# Patient Record
Sex: FEMALE | Race: WHITE
[De-identification: names, ages, dates, MRNs, and addresses within clinical notes are randomized per-mention and may not be internally consistent; named-entity substitution may affect disease eponyms.]

---

## 2018-06-18 ENCOUNTER — HOSPITAL ENCOUNTER (EMERGENCY)
Dept: HOSPITAL 83 - ED | Age: 28
Discharge: HOME | End: 2018-06-18
Payer: COMMERCIAL

## 2018-06-18 VITALS — WEIGHT: 165 LBS | BODY MASS INDEX: 25.9 KG/M2 | HEIGHT: 66.97 IN

## 2018-06-18 DIAGNOSIS — F17.200: ICD-10-CM

## 2018-06-18 DIAGNOSIS — R07.89: Primary | ICD-10-CM

## 2018-06-18 DIAGNOSIS — Z88.0: ICD-10-CM

## 2021-02-03 ENCOUNTER — TELEPHONE (OUTPATIENT)
Dept: OBGYN CLINIC | Age: 31
End: 2021-02-03

## 2021-03-02 ENCOUNTER — ANCILLARY PROCEDURE (OUTPATIENT)
Dept: OBGYN CLINIC | Age: 31
End: 2021-03-02
Payer: COMMERCIAL

## 2021-03-02 ENCOUNTER — ROUTINE PRENATAL (OUTPATIENT)
Dept: OBGYN CLINIC | Age: 31
End: 2021-03-02
Payer: COMMERCIAL

## 2021-03-02 VITALS
BODY MASS INDEX: 29.44 KG/M2 | SYSTOLIC BLOOD PRESSURE: 108 MMHG | TEMPERATURE: 97.6 F | WEIGHT: 188 LBS | DIASTOLIC BLOOD PRESSURE: 72 MMHG | HEART RATE: 83 BPM

## 2021-03-02 DIAGNOSIS — O35.2XX0 HEREDITARY DISEASE IN FAMILY POSSIBLY AFFECTING FETUS, AFFECTING MANAGEMENT OF MOTHER IN PREGNANCY, SINGLE OR UNSPECIFIED FETUS: Primary | ICD-10-CM

## 2021-03-02 DIAGNOSIS — Z03.75 SUSPECTED SHORTENING OF CERVIX NOT FOUND: ICD-10-CM

## 2021-03-02 DIAGNOSIS — Z3A.19 19 WEEKS GESTATION OF PREGNANCY: ICD-10-CM

## 2021-03-02 DIAGNOSIS — O99.332 TOBACCO SMOKING AFFECTING PREGNANCY IN SECOND TRIMESTER: ICD-10-CM

## 2021-03-02 DIAGNOSIS — O46.8X2 SUBCHORIONIC HEMATOMA IN SECOND TRIMESTER, SINGLE OR UNSPECIFIED FETUS: ICD-10-CM

## 2021-03-02 DIAGNOSIS — O34.219 PREVIOUS CESAREAN DELIVERY, ANTEPARTUM CONDITION OR COMPLICATION: ICD-10-CM

## 2021-03-02 DIAGNOSIS — Z36.89 ENCOUNTER FOR FETAL ANATOMIC SURVEY: ICD-10-CM

## 2021-03-02 DIAGNOSIS — O99.322 DRUG DEPENDENCE AFFECTING PREGNANCY IN SECOND TRIMESTER: ICD-10-CM

## 2021-03-02 DIAGNOSIS — F12.20 CANNABIS DEPENDENCE (HCC): ICD-10-CM

## 2021-03-02 DIAGNOSIS — Z14.8: ICD-10-CM

## 2021-03-02 DIAGNOSIS — O09.212 PREVIOUS PRETERM DELIVERY IN SECOND TRIMESTER, ANTEPARTUM: ICD-10-CM

## 2021-03-02 DIAGNOSIS — O41.8X20 SUBCHORIONIC HEMATOMA IN SECOND TRIMESTER, SINGLE OR UNSPECIFIED FETUS: ICD-10-CM

## 2021-03-02 LAB
GLUCOSE URINE, POC: NORMAL
PROTEIN UA: NEGATIVE

## 2021-03-02 PROCEDURE — G8484 FLU IMMUNIZE NO ADMIN: HCPCS | Performed by: OBSTETRICS & GYNECOLOGY

## 2021-03-02 PROCEDURE — 81002 URINALYSIS NONAUTO W/O SCOPE: CPT | Performed by: OBSTETRICS & GYNECOLOGY

## 2021-03-02 PROCEDURE — 76811 OB US DETAILED SNGL FETUS: CPT | Performed by: OBSTETRICS & GYNECOLOGY

## 2021-03-02 PROCEDURE — 99203 OFFICE O/P NEW LOW 30 MIN: CPT | Performed by: OBSTETRICS & GYNECOLOGY

## 2021-03-02 PROCEDURE — 76817 TRANSVAGINAL US OBSTETRIC: CPT | Performed by: OBSTETRICS & GYNECOLOGY

## 2021-03-02 PROCEDURE — G8419 CALC BMI OUT NRM PARAM NOF/U: HCPCS | Performed by: OBSTETRICS & GYNECOLOGY

## 2021-03-02 PROCEDURE — G8427 DOCREV CUR MEDS BY ELIG CLIN: HCPCS | Performed by: OBSTETRICS & GYNECOLOGY

## 2021-03-02 PROCEDURE — 99242 OFF/OP CONSLTJ NEW/EST SF 20: CPT | Performed by: OBSTETRICS & GYNECOLOGY

## 2021-03-02 NOTE — PROGRESS NOTES
3/2/21    RE:  Lucina Gore   : 1990   AGE: 35 y.o. REFERRING PHYSICIAN:                 Bala Ordonez    Dear Dr. Melanie Mathews you for referring Lucina whittaker 35 y.o.  [id number] who is seen today in our office. 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 Mrs Lucina Gore gave the following history when I saw her today:    OB History    Para Term  AB Living   4 3 1 2 0 2   SAB TAB Ectopic Molar Multiple Live Births   0 0 0 0 0 2      # Outcome Date GA Lbr Sanya/2nd Weight Sex Delivery Anes PTL Lv   4 Current            3 Term 13 38w0d  5 lb 6 oz (2.438 kg) F CS-LTranv   MEHNAZ   2  12 27w1d  2 lb 7.5 oz (1.12 kg) M CS-LTranv         Name: Carlene Sheridan: 7  Apgar5: 9   1  11 33w0d  3 lb 6 oz (1.531 kg) F CS-LTranv Gen Y MEHNAZ      Birth Comments: Breech, Stayed in NICU for 2 weeks. PAST GYNECOLOGICAL  HISTORY:  Negative for abnormal pap smears requiring surgical treatment. Negative for sexually transmitted diseases. PAST MEDICAL HISTORY:  Past Medical History:   Diagnosis Date    Anemia     Bronchitis, asthmatic     Not taking medication since     Negative for Hypertension, Diabetes, Thyroid disease , or Heart disease. PAST SURGICAL HISTORY:  Past Surgical History:   Procedure Laterality Date     SECTION      FOOT SURGERY      Trauma    LUNG SURGERY     Negative for Appendectomy, Cholecystectomy or surgery on the cervix such as LEEP, [facility] or Cryotherapy. ALLERGIES:    Allergies   Allergen Reactions    Ceclor [Cefaclor] Hives and Shortness Of Breath    Iodine Rash     MEDICATIONS:    Prenatal Vitamins once per day   17p 250 mg IM once per week. Ferrous sulfate 325 mg orally once per day. SOCIAL  HISTORY:   · She smokes 10 cigarettes/day.   · She said that she stopped using marijuana on 2020 (she had a positive drug screen on 2021). · She denied alcohol and other illicit drug abuse. REVIEW OF SYSTEMS:    CONSTITUTIONAL : No fever, no chills   HEENT :  No headache, no visual changes, no rhinorrhea, no sore throat   CARDIOVASCULAR :  No pain, no palpitations, no edema   RESPIRATORY :  No pain, no shortness of breath   GASTROINTESTINAL : No N/V, no D/C, no abdominal pain   GENITOURINARY :  No dysuria, hematuria and no incontinence   MUSCULOSKELETAL:  No myalgia, No back pain  NEUROLOGICAL :  No numbness, no tingling, no tremors. No history of seizures    FAMILY MEDICAL HISTORY:   Negative for birth defects, chromosomal anomalies and Mental retardation. OB Genetic Screening    Patient's Age 35+ at Date of Delivery No     Thalassemia MCV<80 No     Neural Tube Defect No     Congenital Heart Defect No     Down Syndrome No     Byron-Sachs No     Sickle Cell Disease or Trait No     Hemophilia No     Muscular Dystrophy No     Cystic Fibrosis No     [place] Chorea No     Mental Retardation/Autism Yes second male cousin - Autistic    Was Person Treated for Fragilex? No     Other Inherited Genetic Chromosomal Disorder? Yes MOB carrier for SMA    Maternal Metabolic Disorder No     Patient or [de-identified] Father Had Other Defects? No     Recurrent Pregnancy Loss or Still Birth? No        OB Infection History    Blood Type O POS      High Risk Hepatitis B/Immunized? No     Live with Someone with or Exposed to TB? No     Patient or Partner has Hx of Genital Herpes? No     Rash or Viral Illness Since LMP? No     History of STD/GC/Chlamydia/HPV/Syphilis? No        Mrs Gretchen Ashraf had an uneventful course of pregnancy so far. She was started on treatment with weekly injections of 17 P in your office because of history of 2 previous  deliveries. When seen today in our office she had no complaints.      PHYSICAL EXAMINATION:    General genetic amniocentesis. Termination of pregnancy is illegal up to 24 weeks of gestation. Patient said that she would not consider termination of pregnancy if baby have birth defects chromosomal anomalies mental or motor retardation. She said however that she would like father of the baby to be tested. She said that this will give her peace of mind if he tests negative. He was not with her today and they live far away. She said that testing of the father of her baby was offered at your office. She lives 10 minutes away from your office. I recommend that the test be done as soon as possible. 10. She is to continue the treatment with weekly injections of 17 P, to delay onset and decrease the likelihood of another  delivery. 11. The cervical length measurement today is reassuring. It is within normal limits. No funneling or shortening were noted. 12. She is to continue to follow with you in your office for ongoing obstetric care. 13. I recommend follow-up ultrasound evaluation in our [facility] office in 4 weeks to check on cervical length and fetal wellbeing anatomy and growth, and to check on resolution of the large subchorionic organized hematoma. Thank you again, doctor, for allowing us to be of service to your patient. If I can be of further assistance, please do not hesitate to call. Sincerely,        Cesia Apple M.D., [address] [facility]    Time spent on the encounter was over 35 minutes including counseling  coordination of care and direct face-to-face contact with the patient. Current encounter billing:  [place] OFFICE CONSULTATION NEW/ESTAB PATIENT 30 MIN [[id number]]  US OB Detail Fetal Anatomy Single or 1st [id number] Custom]  US OB Transvaginal [id number] Custom]    **This report has been created using voice recognition software.  It may contain minor errors     which are inherent in voice recognition technology**

## 2021-03-02 NOTE — PATIENT INSTRUCTIONS
Please arrive for your scheduled appointment at least 15 minutes early with your actual insurance card+ a photo ID. Also if you need any refills ordered or have questions, it may take up 48 hours to reply. Please allow ample time for your refills. Call me when you use last refill. Thank you for your cooperation. Any questions contact Gabriella at [phone]. If you are experiencing an emergency and need immediate help, call 911 or go to go emergency room or labor and delivery. if you are sick, not feeling well or have an infectious process going on please reschedule your appointment by calling [phone]. Also if any family members are not feeling well, please do not bring them to your appointment. We appreciate your cooperation. We are doing this in order to protect our pregnant mothers+ their babies. Call your primary obstetrician with bleeding, leaking of fluid, abdominal tenderness, headache, blurry vision, epigastric pain and increased urinary frequency. Patient Education        Weeks 18 to 22 of Your Pregnancy: Care Instructions  Your Care Instructions     Your baby is continuing to develop quickly. At this stage, babies can now suck their thumbs,  firmly with their hands, and open and close their eyelids. Sometime between 18 and 22 weeks, you will start to feel your baby move. At first, these small fetal movements feel like fluttering or \"butterflies. \" Some women say that they feel like gas bubbles. As the baby grows, these movements will become stronger. You may also notice that your baby kicks and hiccups. During this time, you may find that your nausea and fatigue are gone. Overall, you may feel better and have more energy than you did in your first trimester. But you may also have new discomforts now, such as sleep problems or leg cramps. This care sheet can help you ease these discomforts. Follow-up care is a key part of your treatment and safety.  Be sure to make and go to all appointments, and call your doctor if you are having problems. It's also a good idea to know your test results and keep a list of the medicines you take. How can you care for yourself at home? Ease sleep problems  · Avoid caffeine in drinks or chocolate late in the day. · Get some exercise every day. · Take a warm shower or bath before bed. · Have a light snack or glass of milk at bedtime. · Do relaxation exercises in bed to calm your mind and body. · Support your legs and back with extra pillows. Try a pillow between your legs if you sleep on your side. · Do not use sleeping pills or alcohol. They could harm your baby. Ease leg cramps  · Do not massage your calf during the cramp. · Sit on a firm bed or chair. Straighten your leg, and bend your foot (flex your ankle) slowly upward, toward your knee. Bend your toes up and down. · Stand on a cool, flat surface. Stretch your toes upward, and take small steps walking on your heels. · Use a heating pad or hot water bottle to help with muscle ache. Prevent leg cramps  · Be sure to get enough calcium. If you are worried that you are not getting enough, talk to your doctor. · Exercise every day, and stretch your legs before bed. · Take a warm bath before bed, and try leg warmers at night. Where can you learn more? Go to https://[employer]. org and sign in to your [employer] account. Enter Z508 in the [facility] box to learn more about \"Weeks 18 to 22 of Your Pregnancy: Care Instructions. \"     If you do not have an account, please click on the \"Sign Up Now\" link. Current as of: February 11, 2020               Content Version: 12.6  © 0489-2958 [employer]. Care instructions adapted under license by [facility] ([facility]). If you have questions about a medical condition or this instruction, always ask your healthcare professional. [address] any warranty or liability for your use of this information.          Patient link.  Current as of: February 11, 2020               Content Version: 12.6  © 4592-3884 [employer]. Care instructions adapted under license by [facility] ([facility]). If you have questions about a medical condition or this instruction, always ask your healthcare professional. [address] any warranty or liability for your use of this information.

## 2021-03-02 NOTE — LETTER
· She said that she stopped using marijuana on 2020 (she had a positive drug screen on 2021). · She denied alcohol and other illicit drug abuse. REVIEW OF SYSTEMS:    CONSTITUTIONAL : No fever, no chills   HEENT :  No headache, no visual changes, no rhinorrhea, no sore throat   CARDIOVASCULAR :  No pain, no palpitations, no edema   RESPIRATORY :  No pain, no shortness of breath   GASTROINTESTINAL : No N/V, no D/C, no abdominal pain   GENITOURINARY :  No dysuria, hematuria and no incontinence   MUSCULOSKELETAL:  No myalgia, No back pain  NEUROLOGICAL :  No numbness, no tingling, no tremors. No history of seizures    FAMILY MEDICAL HISTORY:   Negative for birth defects, chromosomal anomalies and Mental retardation. OB Genetic Screening    Patient's Age 35+ at Date of Delivery No     Thalassemia MCV<80 No     Neural Tube Defect No     Congenital Heart Defect No     Down Syndrome No     Byron-Sachs No     Sickle Cell Disease or Trait No     Hemophilia No     Muscular Dystrophy No     Cystic Fibrosis No     [place] Chorea No     Mental Retardation/Autism Yes second male cousin - Autistic    Was Person Treated for Fragilex? No     Other Inherited Genetic Chromosomal Disorder? Yes MOB carrier for SMA    Maternal Metabolic Disorder No     Patient or [de-identified] Father Had Other Defects? No     Recurrent Pregnancy Loss or Still Birth? No        OB Infection History    Blood Type O POS      High Risk Hepatitis B/Immunized? No     Live with Someone with or Exposed to TB? No     Patient or Partner has Hx of Genital Herpes? No     Rash or Viral Illness Since LMP? No     History of STD/GC/Chlamydia/HPV/Syphilis? No        Mrs Jose Luque had an uneventful course of pregnancy so far. She was started on treatment with weekly injections of 17 P in your office because of history of 2 previous  deliveries. When seen today in our office she had no complaints. 5. Father of her baby Mr Lance Mckeon ( 7/3/1984) was not with her today. He was not tested for SMA. I told her that she should be tested. If he tests negative then there is no risk that her baby will have the disease. If he tests positive there is a 25% chance that the baby will have a debilitating disease, that can be diagnosed through a genetic amniocentesis. Termination of pregnancy is illegal up to 24 weeks of gestation. Patient said that she would not consider termination of pregnancy if baby have birth defects chromosomal anomalies mental or motor retardation. She said however that she would like father of the baby to be tested. She said that this will give her peace of mind if he tests negative. He was not with her today and they live far away. She said that testing of the father of her baby was offered at your office. She lives 10 minutes away from your office. I recommend that the test be done as soon as possible. 10. She is to continue the treatment with weekly injections of 17 P, to delay onset and decrease the likelihood of another  delivery. 11. The cervical length measurement today is reassuring. It is within normal limits. No funneling or shortening were noted. 12. She is to continue to follow with you in your office for ongoing obstetric care. 13. I recommend follow-up ultrasound evaluation in our [facility] office in 4 weeks to check on cervical length and fetal wellbeing anatomy and growth, and to check on resolution of the large subchorionic organized hematoma. Thank you again, , for allowing us to be of service to your patient. If I can be of further assistance, please do not hesitate to call. Sincerely,        Belle Sloan M.D., [address] [facility]    Time spent on the encounter was over 35 minutes including counseling  coordination of care and direct face-to-face contact with the patient.     Current encounter billing: [place] OFFICE CONSULTATION NEW/ESTAB PATIENT 30 MIN [[id number]]  US OB Detail Fetal Anatomy Single or 1st [id number] Custom]  US OB Transvaginal [id number] Custom]    **This report has been created using voice recognition software.  It may contain minor errors     which are inherent in voice recognition technology**

## 2021-03-30 ENCOUNTER — ANCILLARY PROCEDURE (OUTPATIENT)
Dept: OBGYN CLINIC | Age: 31
End: 2021-03-30
Payer: COMMERCIAL

## 2021-03-30 ENCOUNTER — ROUTINE PRENATAL (OUTPATIENT)
Dept: OBGYN CLINIC | Age: 31
End: 2021-03-30
Payer: COMMERCIAL

## 2021-03-30 VITALS
DIASTOLIC BLOOD PRESSURE: 64 MMHG | HEART RATE: 74 BPM | HEIGHT: 67 IN | SYSTOLIC BLOOD PRESSURE: 92 MMHG | WEIGHT: 196.6 LBS | TEMPERATURE: 97.4 F | BODY MASS INDEX: 30.86 KG/M2

## 2021-03-30 DIAGNOSIS — O99.322 DRUG DEPENDENCE AFFECTING PREGNANCY IN SECOND TRIMESTER: ICD-10-CM

## 2021-03-30 DIAGNOSIS — Z36.89 ENCOUNTER FOR FETAL ANATOMIC SURVEY: ICD-10-CM

## 2021-03-30 DIAGNOSIS — O09.212 PREVIOUS PRETERM DELIVERY IN SECOND TRIMESTER, ANTEPARTUM: Primary | ICD-10-CM

## 2021-03-30 DIAGNOSIS — Z03.75 SUSPECTED SHORTENING OF CERVIX NOT FOUND: ICD-10-CM

## 2021-03-30 DIAGNOSIS — O34.219 PREVIOUS CESAREAN DELIVERY, ANTEPARTUM CONDITION OR COMPLICATION: ICD-10-CM

## 2021-03-30 DIAGNOSIS — Z14.8: ICD-10-CM

## 2021-03-30 DIAGNOSIS — O46.8X2 SUBCHORIONIC HEMATOMA IN SECOND TRIMESTER, SINGLE OR UNSPECIFIED FETUS: ICD-10-CM

## 2021-03-30 DIAGNOSIS — O41.8X20 SUBCHORIONIC HEMATOMA IN SECOND TRIMESTER, SINGLE OR UNSPECIFIED FETUS: ICD-10-CM

## 2021-03-30 DIAGNOSIS — O35.2XX0 HEREDITARY DISEASE IN FAMILY POSSIBLY AFFECTING FETUS, AFFECTING MANAGEMENT OF MOTHER IN PREGNANCY, SINGLE OR UNSPECIFIED FETUS: ICD-10-CM

## 2021-03-30 DIAGNOSIS — Z3A.23 23 WEEKS GESTATION OF PREGNANCY: ICD-10-CM

## 2021-03-30 DIAGNOSIS — F12.20 CANNABIS DEPENDENCE (HCC): ICD-10-CM

## 2021-03-30 DIAGNOSIS — O99.332 TOBACCO SMOKING AFFECTING PREGNANCY IN SECOND TRIMESTER: ICD-10-CM

## 2021-03-30 LAB
GLUCOSE URINE, POC: NEGATIVE
PROTEIN UA: NEGATIVE

## 2021-03-30 PROCEDURE — G8484 FLU IMMUNIZE NO ADMIN: HCPCS | Performed by: OBSTETRICS & GYNECOLOGY

## 2021-03-30 PROCEDURE — G8419 CALC BMI OUT NRM PARAM NOF/U: HCPCS | Performed by: OBSTETRICS & GYNECOLOGY

## 2021-03-30 PROCEDURE — 76819 FETAL BIOPHYS PROFIL W/O NST: CPT | Performed by: OBSTETRICS & GYNECOLOGY

## 2021-03-30 PROCEDURE — 76805 OB US >/= 14 WKS SNGL FETUS: CPT | Performed by: OBSTETRICS & GYNECOLOGY

## 2021-03-30 PROCEDURE — 99212 OFFICE O/P EST SF 10 MIN: CPT | Performed by: OBSTETRICS & GYNECOLOGY

## 2021-03-30 PROCEDURE — 4004F PT TOBACCO SCREEN RCVD TLK: CPT | Performed by: OBSTETRICS & GYNECOLOGY

## 2021-03-30 PROCEDURE — 76817 TRANSVAGINAL US OBSTETRIC: CPT | Performed by: OBSTETRICS & GYNECOLOGY

## 2021-03-30 PROCEDURE — 81002 URINALYSIS NONAUTO W/O SCOPE: CPT | Performed by: OBSTETRICS & GYNECOLOGY

## 2021-03-30 PROCEDURE — 99213 OFFICE O/P EST LOW 20 MIN: CPT | Performed by: OBSTETRICS & GYNECOLOGY

## 2021-03-30 PROCEDURE — G8427 DOCREV CUR MEDS BY ELIG CLIN: HCPCS | Performed by: OBSTETRICS & GYNECOLOGY

## 2021-03-30 NOTE — LETTER
present time)  8. Subchorionic organized hematoma (arising from fundal area of placenta)  9. Genetic consultation with [facility] ordered, not done yet. RECOMMENDATIONS/PLAN:  I discussed with the patient the following points:    1. The benefits and limitations of ultrasound in prenatal diagnosis. Some defects might not always be seen by ultrasound. Estimated incidence of these defects in the general population is 2- 4%. 2. No structural  anomalies are noted. Only genetic amniocentesis can rule out fetal chromosome anomalies. Normal ultrasound does not. 3. The size of her baby is appropriate for gestational age. 4. The ill effects of cigarette smoking and substance abuse during pregnancy and its association with an increased risk of intrauterine growth restriction, placental abruption, and pregnancy loss. In addition to the increased risk of  morbidity and mortality there is an increased risk of sudden infant death syndrome in the households where people smoke. I recommend that she stops smoking, and abstains from illicit drug use. 5. Spinal muscular atrophy (SMA) is a genetic disorder, with an autosomal recessive pattern, which means that  two copies of the defective gene - one from each parent - are required to inherit the disorder: the parents do not need to be themselves affected. Father of baby not tested yet. Genetic counseling with [facility] ordered, not done yet. 6.   She is to continue the treatment with weekly injections of 17 P, to delay onset and decrease the likelihood of another  delivery. 7. The cervical length measurement today is reassuring. It is within normal limits. No funneling or shortening were noted. 8. Fetal well-being was confirmed today.   The amount of fluid around baby is normal.  The Biophysical profile score of 8/8 is reassuring, and the umbilical artery Doppler studies are normal.  9. She should monitor fetal well-being at home by counting movements after dinner. Her baby should  move 10 times in 2 hours; otherwise, she should call your office immediately. She is also to call, if she develops any headaches, blurred vision, abdominal pain, bleeding, or spotting, which are signs of preeclampsia. 10. She is to continue to follow with you in your office for ongoing obstetric care. 11. I recommend follow-up ultrasound evaluation in our [facility] office in 4 weeks to check on cervical length and fetal wellbeing anatomy and growth, and to check on resolution of the large subchorionic organized hematoma. Thank you again, doctor, for allowing us to be of service to your patient. If I can be of further assistance, please do not hesitate to call. Sincerely,        Hernandez Mittal M.D., [facility]    Time spent on the encounter was over 15 minutes including counseling  coordination of care and direct face-to-face contact with the patient. Current encounter billing:  ME OFFICE OUTPATIENT VISIT 10-19 MINUTES [[id number]]  US OB 14 Plus Weeks Single or First Gestation [[id number] Custom]  US Fetal Biophysical Profile WO Non Stress Testing [[id number] Custom]  US OB Transvaginal [[id number] Custom]    **This report has been created using voice recognition software.  It may contain minor errors     which are inherent in voice recognition technology**

## 2021-03-30 NOTE — PROGRESS NOTES
3/30/21     RE:  Helio Wolf   : 1990   AGE: 35 y.o. REFERRING PHYSICIAN:                 Deysi Rangel    Dear     Mrs. Helio whittaker 35 y.o.  [id number]  is seen today on follow up in our office. 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 MEDICATIONS:    Prenatal Vitamins once per day   17p 250 mg IM once per week. Ferrous sulfate 325 mg orally once per day. INTERVAL HISTORY:  Mrs Helio Wolf had an uneventful course of pregnancy since her last visit to our office. When seen today in our office she had no complaints. She is still smoking 7 cigarettes/day. She stopped using marijuana in the first trimester, when found pregnant. PHYSICAL EXAMINATION:  General Appearance:  Healthy looking, alert, no acute distress. Eyes:     No pallor, no icterus, no photophobia. Ears:     No ear drainage. Nose:     No nasal drainage, no paranasal sinus tenderness. Throat:   Mucosa moist, no oral thrush, no exudate. Neck:     No nuchal rigidity. Back:     No CVA tenderness. Abdomen:    Soft nontender. Extremities:    No pretibial pitting edema, no calf muscle tenderness. Skin:     No rashes, no lesions. BP: 92/64 Weight: 196 lb 9.6 oz (89.2 kg) Height: 5' 7\" (170.2 cm) Pulse: 74     Body mass index is 30.79 kg/m². Urine dipstick:  Glucose : Negative   Albumin:  Negative       An ultrasound evaluation was done in our office today. Please refer to the enclosed copy of the ultrasound report for further information. IMPRESSION:  1. A  23w6d  intrauterine gestation. 2. Carrier of SMA (spinal muscular atrophy) mutation. 3. Two previous  deliveries (At 28 and 27 weeks)  4. Three previous  deliveries. 5. Cigarette smoking during pregnancy. 6. Marijuana abuse during pregnancy. (Positive drug screen 2021)  7.  Asthma (not active at present time)  8. Subchorionic organized hematoma (arising from fundal area of placenta)  9. Genetic consultation with [facility] ordered, not done yet. RECOMMENDATIONS/PLAN:  I discussed with the patient the following points:    1. The benefits and limitations of ultrasound in prenatal diagnosis. Some defects might not always be seen by ultrasound. Estimated incidence of these defects in the general population is 2- 4%. 2. No structural  anomalies are noted. Only genetic amniocentesis can rule out fetal chromosome anomalies. Normal ultrasound does not. 3. The size of her baby is appropriate for gestational age. 4. The ill effects of cigarette smoking and substance abuse during pregnancy and its association with an increased risk of intrauterine growth restriction, placental abruption, and pregnancy loss. In addition to the increased risk of  morbidity and mortality there is an increased risk of sudden infant death syndrome in the households where people smoke. I recommend that she stops smoking, and abstains from illicit drug use. 5. Spinal muscular atrophy (SMA) is a genetic disorder, with an autosomal recessive pattern, which means that  two copies of the defective gene - one from each parent - are required to inherit the disorder: the parents do not need to be themselves affected. Father of baby not tested yet. Genetic counseling with [facility] ordered, not done yet. 6.   She is to continue the treatment with weekly injections of 17 P, to delay onset and decrease the likelihood of another  delivery. 7. The cervical length measurement today is reassuring. It is within normal limits. No funneling or shortening were noted. 8. Fetal well-being was confirmed today.   The amount of fluid around baby is normal.  The Biophysical profile score of 8/8 is reassuring, and the umbilical artery Doppler studies are normal.  9. She should monitor fetal well-being at home by counting movements after dinner. Her baby should  move 10 times in 2 hours; otherwise, she should call your office immediately. She is also to call, if she develops any headaches, blurred vision, abdominal pain, bleeding, or spotting, which are signs of preeclampsia. 10. She is to continue to follow with you in your office for ongoing obstetric care. 11. I recommend follow-up ultrasound evaluation in our [facility] office in 4 weeks to check on cervical length and fetal wellbeing anatomy and growth, and to check on resolution of the large subchorionic organized hematoma. Thank you again, doctor, for allowing us to be of service to your patient. If I can be of further assistance, please do not hesitate to call. Sincerely,        Sultana Mays M.D., [address] [facility]    Time spent on the encounter was over 15 minutes including counseling  coordination of care and direct face-to-face contact with the patient. Current encounter billing:  [place] OFFICE OUTPATIENT VISIT 10-19 MINUTES [[id number]]  US OB 14 Plus Weeks Single or First Gestation [[id number] Custom]  US Fetal Biophysical Profile WO Non Stress Testing [[id number] Custom]  US OB Transvaginal [38005 Custom]    **This report has been created using voice recognition software.  It may contain minor errors     which are inherent in voice recognition technology**

## 2021-03-30 NOTE — PATIENT INSTRUCTIONS
Call your primary obstetrician with bleeding, leaking of fluid, abdominal tenderness, headache, blurry vision, epigastric pain and increased urinary frequency. If you are experiencing an emergency and need immediate help, call 911 or go to go emergency room or labor and delivery. Please arrive for your scheduled appointment at least 15 minutes early with your actual insurance card+ a photo ID. Also if you need any refills ordered or have questions, it may take up 48 hours to reply. Please allow ample time for your refills. Call me when you use last refill. Thank you for your cooperation. You might be having an NST at your next appt. Please eat a large snack or breakfast before coming to office. Thank youif you are sick, not feeling well or have an infectious process going on please reschedule your appointment by calling [phone]. Also if any family members are not feeling well, please do not bring them to your appointment. We appreciate your cooperation. We are doing this in order to protect our pregnant mothers+ their babies. Patient Education        Weeks 22 to 26 of Your Pregnancy: Care Instructions  Overview     As you enter your 7th month of pregnancy at week 26, your baby's lungs are growing stronger and getting ready to breathe. You may notice that your baby responds to the sound of your or your partner's voice. You may also notice that your baby does less turning and twisting and more squirming, kicking, or jerking. Jerking often means that your baby has hiccups. Hiccups are normal and are only temporary. You may want to think about attending a childbirth preparation class. This is also a good time to start thinking about whether you want to have pain medicine during labor. Most pregnant women are tested for gestational diabetes between weeks 25 and 28. Gestational diabetes occurs when your blood sugar level gets too high when you're pregnant.  The test is important, because you can have gestational diabetes and not know it. But the condition can cause problems for your baby. Follow-up care is a key part of your treatment and safety. Be sure to make and go to all appointments, and call your doctor if you are having problems. It's also a good idea to know your test results and keep a list of the medicines you take. How can you care for yourself at home? Ease discomfort from your baby's kicking  · Change your position. Sometimes this will cause your baby to change position too. · Take a deep breath while you raise your arm over your head. Then breathe out while you drop your arm. Do Kegel exercises to prevent urine from leaking  · You can do Kegel exercises while you stand or sit. ? Squeeze the same muscles you would use to stop your urine. Your belly and thighs should not move. ? Hold the squeeze for 3 seconds, and then relax for 3 seconds. ? Start with 3 seconds. Then add 1 second each week until you are able to squeeze for 10 seconds. ? Repeat the exercise 10 to 15 times for each session. Do three or more sessions each day. Ease or reduce swelling in your feet, ankles, hands, and fingers  · If your fingers are puffy, take off your rings. · Do not eat high-salt foods, such as potato chips. · Prop up your feet on a stool or couch as much as possible. Sleep with pillows under your feet. · Do not stand for long periods of time or wear tight shoes. · Wear support stockings. Where can you learn more? Go to https://AxelaCarepeOmgili.Seismotech. org and sign in to your [facility] account. Enter G264 in the [facility] box to learn more about \"Weeks 22 to 26 of Your Pregnancy: Care Instructions. \"     If you do not have an account, please click on the \"Sign Up Now\" link. Current as of: October 8, 2020               Content Version: 12.8  © 2229-5033 Healthwise, Incorporated. Care instructions adapted under license by [facility] ([facility]).  If you have questions about a medical condition or this instruction, always ask your healthcare professional. [place] [address] any warranty or liability for your use of this information. Patient Education        Learning About When to Call Your Doctor During Pregnancy (After 20 Weeks)  Your Care Instructions  It's common to have concerns about what might be a problem during pregnancy. Although most pregnant women don't have any serious problems, it's important to know when to call your doctor if you have certain symptoms or signs of labor. These are general suggestions. Your doctor may give you some more information about when to call. When to call your doctor (after 20 weeks)  Call 911 anytime you think you may need emergency care. For example, call if:  · You have severe vaginal bleeding. · You have sudden, severe pain in your belly. · You passed out (lost consciousness). · You have a seizure. · You see or feel the umbilical cord. · You think you are about to deliver your baby and can't make it safely to the hospital.  Call your doctor now or seek immediate medical care if:  · You have vaginal bleeding. · You have belly pain. · You have a fever. · You have symptoms of preeclampsia, such as:  ? Sudden swelling of your face, hands, or feet. ? New vision problems (such as dimness, blurring, or seeing spots). ? A severe headache. · You have a sudden release of fluid from your vagina. (You think your water broke.)  · You think that you may be in labor. This means that you've had at least 6 contractions in an hour. · You notice that your baby has stopped moving or is moving much less than normal.  · You have symptoms of a urinary tract infection. These may include:  ? Pain or burning when you urinate. ? A frequent need to urinate without being able to pass much urine. ? Pain in the flank, which is just below the rib cage and above the waist on either side of the back. ? Blood in your urine.   Watch closely for changes in your health, and be sure to contact your doctor if:  · You have vaginal discharge that smells bad. · You have skin changes, such as:  ? A rash. ? Itching. ? Yellow color to your skin. · You have other concerns about your pregnancy. If you have labor signs at 37 weeks or more  If you have signs of labor at 37 weeks or more, your doctor may tell you to call when your labor becomes more active. Symptoms of active labor include:  · Contractions that are regular. · Contractions that are less than 5 minutes apart. · Contractions that are hard to talk through. Follow-up care is a key part of your treatment and safety. Be sure to make and go to all appointments, and call your doctor if you are having problems. It's also a good idea to know your test results and keep a list of the medicines you take. Where can you learn more? Go to https://YourEncorepepicewLifecrowd.Digonex Technologies. org and sign in to your [employer] account. Enter  in the [employer] box to learn more about \"Learning About When to Call Your Doctor During Pregnancy (After 20 Weeks). \"     If you do not have an account, please click on the \"Sign Up Now\" link. Current as of: October 8, 2020               Content Version: 12.8  © 2006-2021 Healthwise, Incorporated. Care instructions adapted under license by [facility] ([facility]). If you have questions about a medical condition or this instruction, always ask your healthcare professional. [place] [address] any warranty or liability for your use of this information.

## 2021-05-04 ENCOUNTER — ANCILLARY PROCEDURE (OUTPATIENT)
Dept: OBGYN CLINIC | Age: 31
End: 2021-05-04
Payer: COMMERCIAL

## 2021-05-04 ENCOUNTER — ROUTINE PRENATAL (OUTPATIENT)
Dept: OBGYN CLINIC | Age: 31
End: 2021-05-04
Payer: COMMERCIAL

## 2021-05-04 VITALS
SYSTOLIC BLOOD PRESSURE: 99 MMHG | WEIGHT: 195.8 LBS | DIASTOLIC BLOOD PRESSURE: 63 MMHG | HEIGHT: 67 IN | BODY MASS INDEX: 30.73 KG/M2 | HEART RATE: 90 BPM

## 2021-05-04 DIAGNOSIS — O34.219 PREVIOUS CESAREAN DELIVERY, ANTEPARTUM CONDITION OR COMPLICATION: ICD-10-CM

## 2021-05-04 DIAGNOSIS — O46.8X3 SUBCHORIONIC HEMATOMA IN THIRD TRIMESTER, SINGLE OR UNSPECIFIED FETUS: ICD-10-CM

## 2021-05-04 DIAGNOSIS — O41.8X30 SUBCHORIONIC HEMATOMA IN THIRD TRIMESTER, SINGLE OR UNSPECIFIED FETUS: ICD-10-CM

## 2021-05-04 DIAGNOSIS — O09.213 PREVIOUS PRETERM DELIVERY IN THIRD TRIMESTER, ANTEPARTUM: Primary | ICD-10-CM

## 2021-05-04 DIAGNOSIS — Z14.8: ICD-10-CM

## 2021-05-04 DIAGNOSIS — O99.213 OBESITY AFFECTING PREGNANCY IN THIRD TRIMESTER: ICD-10-CM

## 2021-05-04 DIAGNOSIS — O99.323 DRUG DEPENDENCE AFFECTING PREGNANCY IN THIRD TRIMESTER: ICD-10-CM

## 2021-05-04 DIAGNOSIS — F12.20 CANNABIS DEPENDENCE (HCC): ICD-10-CM

## 2021-05-04 DIAGNOSIS — O35.2XX0 HEREDITARY DISEASE IN FAMILY POSSIBLY AFFECTING FETUS, AFFECTING MANAGEMENT OF MOTHER IN PREGNANCY, SINGLE OR UNSPECIFIED FETUS: ICD-10-CM

## 2021-05-04 DIAGNOSIS — Z3A.28 28 WEEKS GESTATION OF PREGNANCY: ICD-10-CM

## 2021-05-04 DIAGNOSIS — O99.333 TOBACCO SMOKING COMPLICATING PREGNANCY, THIRD TRIMESTER: ICD-10-CM

## 2021-05-04 LAB
GLUCOSE URINE, POC: NEGATIVE
PROTEIN UA: NEGATIVE

## 2021-05-04 PROCEDURE — 81002 URINALYSIS NONAUTO W/O SCOPE: CPT | Performed by: OBSTETRICS & GYNECOLOGY

## 2021-05-04 PROCEDURE — 4004F PT TOBACCO SCREEN RCVD TLK: CPT | Performed by: OBSTETRICS & GYNECOLOGY

## 2021-05-04 PROCEDURE — 99213 OFFICE O/P EST LOW 20 MIN: CPT | Performed by: OBSTETRICS & GYNECOLOGY

## 2021-05-04 PROCEDURE — 76816 OB US FOLLOW-UP PER FETUS: CPT | Performed by: OBSTETRICS & GYNECOLOGY

## 2021-05-04 PROCEDURE — 99212 OFFICE O/P EST SF 10 MIN: CPT | Performed by: OBSTETRICS & GYNECOLOGY

## 2021-05-04 PROCEDURE — G8419 CALC BMI OUT NRM PARAM NOF/U: HCPCS | Performed by: OBSTETRICS & GYNECOLOGY

## 2021-05-04 PROCEDURE — 76817 TRANSVAGINAL US OBSTETRIC: CPT | Performed by: OBSTETRICS & GYNECOLOGY

## 2021-05-04 PROCEDURE — G8427 DOCREV CUR MEDS BY ELIG CLIN: HCPCS | Performed by: OBSTETRICS & GYNECOLOGY

## 2021-05-04 PROCEDURE — 76819 FETAL BIOPHYS PROFIL W/O NST: CPT | Performed by: OBSTETRICS & GYNECOLOGY

## 2021-05-04 NOTE — PROGRESS NOTES
21     RE:  Kayleen Mendez   : 1990   AGE: 35 y.o. REFERRING PHYSICIAN:                 Fina Solis [place]    Dear     Mrs. Kayleen Mendez a 35 y.o.  [id number]  is seen today on follow up in our office. 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 MEDICATIONS:    Prenatal Vitamins once per day   17p 250 mg IM once per week. INTERVAL HISTORY:  Mrs Kayleen Mendez had an uneventful course of pregnancy since her last visit to our office. When seen today in our office she had no complaints. She is still smoking 10 cigarettes/day. She stopped using marijuana in the first trimester, when found pregnant. PHYSICAL EXAMINATION:  General Appearance:  Healthy looking, alert, no acute distress. Eyes:     No pallor, no icterus, no photophobia. Ears:     No ear drainage. Nose:     No nasal drainage, no paranasal sinus tenderness. Throat:   Mucosa moist, no oral thrush, no exudate. Neck:     No nuchal rigidity. Back:     No CVA tenderness. Abdomen:    Soft nontender. Extremities:    No pretibial pitting edema, no calf muscle tenderness. Skin:     No rashes, no lesions. BP: 99/63 Weight: 195 lb 12.8 oz (88.8 kg) Height: 5' 7\" (170.2 cm) Pulse: 90     Body mass index is 30.67 kg/m². Urine dipstick:  Glucose : Negative   Albumin:  Negative       An ultrasound evaluation was done in our office today. Please refer to the enclosed copy of the ultrasound report for further information. IMPRESSION:  1. A  28w6d  intrauterine gestation. 2. Obesity. 3. Carrier of SMA (spinal muscular atrophy) mutation. 4. Two previous  deliveries (At 28 and 27 weeks)  5. Three previous  deliveries. 6. Cigarette smoking during pregnancy. 7. Marijuana abuse during pregnancy. (Positive drug screen 2021)  8. Asthma (not active at present time)  9.  Subchorionic organized hematoma (arising from fundal area of placenta)  10. Genetic consultation with [facility] ordered, not done yet. RECOMMENDATIONS/PLAN:  I discussed with the patient the following points:    1. The benefits and limitations of ultrasound in prenatal diagnosis. Some defects might not always be seen by ultrasound. Estimated incidence of these defects in the general population is 2- 4%. 2. No structural  anomalies are noted. Only genetic amniocentesis can rule out fetal chromosome anomalies. Normal ultrasound does not. 3. Obesity is assosiated with an increased risk of developing gestational diabetes, and disturbance in the growth of her baby, such as Large for dates and small for Dates. She said the gestational diabetes was ruled out with a recent negative glucose tolerance test done in your office. Result of the test not available for review. 4. The size of her baby is appropriate for gestational age. 5. The ill effects of cigarette smoking and substance abuse during pregnancy and its association with an increased risk of intrauterine growth restriction, placental abruption, and pregnancy loss. In addition to the increased risk of  morbidity and mortality there is an increased risk of sudden infant death syndrome in the households where people smoke. I recommend that she stops smoking, and abstains from illicit drug use. 6. Spinal muscular atrophy (SMA) is a genetic disorder, with an autosomal recessive pattern, which means that  two copies of the defective gene - one from each parent - are required to inherit the disorder: the parents do not need to be themselves affected. Father of baby not tested yet. Genetic counseling with [facility] ordered, not done yet. 7.   She is to continue the treatment with weekly injections of 17 P, to delay onset and decrease the likelihood of another  delivery.   8. The cervical length measurement today is

## 2021-05-04 NOTE — PATIENT INSTRUCTIONS
if you are sick, not feeling well or have an infectious process going on please reschedule your appointment by calling [phone]. Also if any family members are not feeling well, please do not bring them to your appointment. We appreciate your cooperation. We are doing this in order to protect our pregnant mothers+ their babies. Call your primary obstetrician with bleeding, leaking of fluid, abdominal tenderness, headache, blurry vision, epigastric pain and increased urinary frequency. Any questions contact Gabriella at [phone]. If you are experiencing an emergency and need immediate help, call 911 or go to go emergency room or labor and delivery. Please arrive for your scheduled appointment at least 15 minutes early with your actual insurance card+ a photo ID. Also if you need any refills ordered or have questions, it may take up 48 hours to reply. Please allow ample time for your refills. Call me when you use last refill. Thank you for your cooperation. You might be having an NST at your next appt. Please eat a large snack or breakfast before coming to office. Thank youDo kick counts after dinner. Call your primary obstetrician if less than 10 kicks in 2 hours after dinner. Call your primary obstetrician with bleeding, leaking of fluid, abdominal tenderness, headache, blurry vision, epigastric pain and increased urinary frequency. Patient Education        Weeks 26 to 30 of Your Pregnancy: Care Instructions  Overview     You are now entering your last trimester of pregnancy. Your baby is growing quickly. Patricia Guzmán probably feel your baby moving around more often. Your doctor may ask you to count your baby's kicks. Your back may ache as your body gets used to your baby's size and length. If you haven't already had the Tdap shot during this pregnancy, talk to your doctor about getting it. It will help protect your  against pertussis infection.   During this time, it's important to take care of yourself and pay attention to what your body needs. If you feel sexual, you can explore ways to be close with your partner that match your comfort and desire. Follow-up care is a key part of your treatment and safety. Be sure to make and go to all appointments, and call your doctor if you are having problems. It's also a good idea to know your test results and keep a list of the medicines you take. How can you care for yourself at home? Take it easy at work  · Take frequent breaks. If possible, stop working when you are tired, and rest during your lunch hour. · Take bathroom breaks every 2 hours. · Change positions often. If you sit for long periods, stand up and walk around. · When you stand for a long time, keep one foot on a low stool with your knee bent. After standing a lot, sit with your feet up. · Avoid fumes, chemicals, and tobacco smoke. Be sexual in your own way  · Having sex during pregnancy is okay, unless your doctor tells you not to. · You may be very interested in sex, or you may have no interest at all. · Your growing belly can make it hard to find a good position during intercourse. [place] and explore. · You may get cramps in your uterus when your partner touches your breasts. · A back rub may relieve the backache or cramps that sometimes follow orgasm. Learn about  labor  · Watch for signs of  labor. You may be going into labor if:  ? You have menstrual-like cramps, with or without nausea. ? You have about 6 or more contractions in 1 hour, even after you have had a glass of water and are resting. ? You have a low, dull backache that does not go away when you change your position. ? You have pain or pressure in your pelvis that comes and goes in a pattern. ? You have intestinal cramping or flu-like symptoms, with or without diarrhea.  ? You notice an increase or change in your vaginal discharge. Discharge may be heavy, mucus-like, watery, or streaked with blood. ?  Your water breaks. · If you think you have  labor:  ? Drink 2 or 3 glasses of water or juice. Not drinking enough fluids can cause contractions. ? Stop what you are doing, and empty your bladder. Then lie down on your left side for at least 1 hour. ? While lying on your side, find your breast bone. Put your fingers in the soft spot just below it. Move your fingers down toward your belly button to find the top of your uterus. Check to see if it is tight. ? Contractions can be weak or strong. Record your contractions for an hour. Time a contraction from the start of one contraction to the start of the next one.  ? Single or several strong contractions without a pattern are called Srikanth-Arevalo contractions. They are practice contractions but not the start of labor. They often stop if you change what you are doing. ? Call your doctor if you have regular contractions. Where can you learn more? Go to https://Wirevik.EaglEyeMed. org and sign in to your [email or web address] account. Enter D742 in the [employer] box to learn more about \"Weeks 26 to 30 of Your Pregnancy: Care Instructions. \"     If you do not have an account, please click on the \"Sign Up Now\" link. Current as of: 2020               Content Version: 12.8   [email or web address]. Care instructions adapted under license by [facility] ([facility]). If you have questions about a medical condition or this instruction, always ask your healthcare professional. [place] [address] any warranty or liability for your use of this information. Patient Education        Learning About When to Call Your Doctor During Pregnancy (After 20 Weeks)  Your Care Instructions  It's common to have concerns about what might be a problem during pregnancy. Although most pregnant women don't have any serious problems, it's important to know when to call your doctor if you have certain symptoms or signs of labor.   These are general suggestions. Your doctor may give you some more information about when to call. When to call your doctor (after 20 weeks)  Call 911 anytime you think you may need emergency care. For example, call if:  · You have severe vaginal bleeding. · You have sudden, severe pain in your belly. · You passed out (lost consciousness). · You have a seizure. · You see or feel the umbilical cord. · You think you are about to deliver your baby and can't make it safely to the hospital.  Call your doctor now or seek immediate medical care if:  · You have vaginal bleeding. · You have belly pain. · You have a fever. · You have symptoms of preeclampsia, such as:  ? Sudden swelling of your face, hands, or feet. ? New vision problems (such as dimness, blurring, or seeing spots). ? A severe headache. · You have a sudden release of fluid from your vagina. (You think your water broke.)  · You think that you may be in labor. This means that you've had at least 6 contractions in an hour. · You notice that your baby has stopped moving or is moving much less than normal.  · You have symptoms of a urinary tract infection. These may include:  ? Pain or burning when you urinate. ? A frequent need to urinate without being able to pass much urine. ? Pain in the flank, which is just below the rib cage and above the waist on either side of the back. ? Blood in your urine. Watch closely for changes in your health, and be sure to contact your doctor if:  · You have vaginal discharge that smells bad. · You have skin changes, such as:  ? A rash. ? Itching. ? Yellow color to your skin. · You have other concerns about your pregnancy. If you have labor signs at 37 weeks or more  If you have signs of labor at 37 weeks or more, your doctor may tell you to call when your labor becomes more active. Symptoms of active labor include:  · Contractions that are regular. · Contractions that are less than 5 minutes apart.   · Contractions that are hard to talk through. Follow-up care is a key part of your treatment and safety. Be sure to make and go to all appointments, and call your doctor if you are having problems. It's also a good idea to know your test results and keep a list of the medicines you take. Where can you learn more? Go to https://chpepiceweb.Meaningo. org and sign in to your Axxana account. Enter  in the [employer] box to learn more about \"Learning About When to Call Your Doctor During Pregnancy (After 20 Weeks). \"     If you do not have an account, please click on the \"Sign Up Now\" link. Current as of: October 8, 2020               Content Version: 12.8  © 2006-2021 Healthwise, PerformYard. Care instructions adapted under license by [facility] ([facility]). If you have questions about a medical condition or this instruction, always ask your healthcare professional. [place] [address] any warranty or liability for your use of this information. Patient Education        Counting Your Baby's Kicks: Care Instructions  Your Care Instructions     Counting your baby's kicks is one way your doctor can tell that your baby is healthy. Most women--especially in a first pregnancy--feel their baby move for the first time between 16 and 22 weeks. The movement may feel like flutters rather than kicks. Your baby may move more at certain times of the day. When you are active, you may notice less kicking than when you are resting. At your prenatal visits, your doctor will ask whether the baby is active. In your last trimester, your doctor may ask you to count the number of times you feel your baby move. Follow-up care is a key part of your treatment and safety. Be sure to make and go to all appointments, and call your doctor if you are having problems. It's also a good idea to know your test results and keep a list of the medicines you take. How do you count fetal kicks?   · A common method of checking your baby's movement is to count the number of kicks or moves you feel in 1 hour. Ten movements (such as kicks, flutters, or rolls) in 1 hour are normal. Some doctors suggest that you count in the morning until you get to 10 movements. Then you can quit for that day and start again the next day. · Pick your baby's most active time of day to count. This may be any time from morning to evening. · If you do not feel 10 movements in an hour, your baby may be sleeping. Wait for the next hour and count again. When should you call for help? Call your doctor now or seek immediate medical care if:    · You noticed that your baby has stopped moving or is moving much less than normal.   Watch closely for changes in your health, and be sure to contact your doctor if you have any problems. Where can you learn more? Go to https://InvertirOnline.compeAlgEvolve.Plynked. org and sign in to your LookStat account. Enter N075 in the Zimplistic box to learn more about \"Counting Your Baby's Kicks: Care Instructions. \"     If you do not have an account, please click on the \"Sign Up Now\" link. Current as of: October 8, 2020               Content Version: 12.8  © 2006-2021 Healthwise, Incorporated. Care instructions adapted under license by [facility] ([facility]). If you have questions about a medical condition or this instruction, always ask your healthcare professional. [place] [address] any warranty or liability for your use of this information.

## 2021-05-04 NOTE — LETTER
21     RE:  Bud Vázquez   : 1990   AGE: 35 y.o. REFERRING PHYSICIAN:                 Chaz Vargas    Dear     Mrs. Bud whittaker 35 y.o.  [id number]  is seen today on follow up in our office. 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 MEDICATIONS:    Prenatal Vitamins once per day   17p 250 mg IM once per week. INTERVAL HISTORY:  Mrs Bud Vázquez had an uneventful course of pregnancy since her last visit to our office. When seen today in our office she had no complaints. She is still smoking 10 cigarettes/day. She stopped using marijuana in the first trimester, when found pregnant. PHYSICAL EXAMINATION:  General Appearance:  Healthy looking, alert, no acute distress. Eyes:     No pallor, no icterus, no photophobia. Ears:     No ear drainage. Nose:     No nasal drainage, no paranasal sinus tenderness. Throat:   Mucosa moist, no oral thrush, no exudate. Neck:     No nuchal rigidity. Back:     No CVA tenderness. Abdomen:    Soft nontender. Extremities:    No pretibial pitting edema, no calf muscle tenderness. Skin:     No rashes, no lesions. BP: 99/63 Weight: 195 lb 12.8 oz (88.8 kg) Height: 5' 7\" (170.2 cm) Pulse: 90     Body mass index is 30.67 kg/m². Urine dipstick:  Glucose : Negative   Albumin:  Negative       An ultrasound evaluation was done in our office today. Please refer to the enclosed copy of the ultrasound report for further information. IMPRESSION:  1. A  28w6d  intrauterine gestation. 2. Obesity. 3. Carrier of SMA (spinal muscular atrophy) mutation. 4. Two previous  deliveries (At 28 and 27 weeks)  5. Three previous  deliveries. 6. Cigarette smoking during pregnancy. 7. Marijuana abuse during pregnancy. (Positive drug screen 2021)  8. Asthma (not active at present time)  9.  Subchorionic organized hematoma (arising from fundal area of placenta)  10. Genetic consultation with [facility] ordered, not done yet. RECOMMENDATIONS/PLAN:  I discussed with the patient the following points:    1. The benefits and limitations of ultrasound in prenatal diagnosis. Some defects might not always be seen by ultrasound. Estimated incidence of these defects in the general population is 2- 4%. 2. No structural  anomalies are noted. Only genetic amniocentesis can rule out fetal chromosome anomalies. Normal ultrasound does not. 3. Obesity is assosiated with an increased risk of developing gestational diabetes, and disturbance in the growth of her baby, such as Large for dates and small for Dates. She said the gestational diabetes was ruled out with a recent negative glucose tolerance test done in your office. Result of the test not available for review. 4. The size of her baby is appropriate for gestational age. 5. The ill effects of cigarette smoking and substance abuse during pregnancy and its association with an increased risk of intrauterine growth restriction, placental abruption, and pregnancy loss. In addition to the increased risk of  morbidity and mortality there is an increased risk of sudden infant death syndrome in the households where people smoke. I recommend that she stops smoking, and abstains from illicit drug use. 6. Spinal muscular atrophy (SMA) is a genetic disorder, with an autosomal recessive pattern, which means that  two copies of the defective gene - one from each parent - are required to inherit the disorder: the parents do not need to be themselves affected. Father of baby not tested yet. Genetic counseling with [facility] ordered, not done yet. 7.   She is to continue the treatment with weekly injections of 17 P, to delay onset and decrease the likelihood of another  delivery.   8. The cervical length measurement today is